# Patient Record
Sex: MALE | Race: WHITE | NOT HISPANIC OR LATINO | URBAN - METROPOLITAN AREA
[De-identification: names, ages, dates, MRNs, and addresses within clinical notes are randomized per-mention and may not be internally consistent; named-entity substitution may affect disease eponyms.]

---

## 2020-07-25 ENCOUNTER — TELEPHONE ENCOUNTER (OUTPATIENT)
Dept: URBAN - METROPOLITAN AREA CLINIC 13 | Facility: CLINIC | Age: 70
End: 2020-07-25

## 2020-07-25 RX ORDER — AMLODIPINE BESYLATE 5 MG/1
TABLET ORAL
Refills: 0 | OUTPATIENT
End: 2017-01-11

## 2020-07-25 RX ORDER — LEVOTHYROXINE SODIUM 0.05 MG/1
TABLET ORAL
Refills: 0 | OUTPATIENT
End: 2017-01-11

## 2020-07-25 RX ORDER — BENAZEPRIL HCL 20 MG
TABLET ORAL
Refills: 0 | OUTPATIENT
End: 2017-01-11

## 2020-07-26 ENCOUNTER — TELEPHONE ENCOUNTER (OUTPATIENT)
Dept: URBAN - METROPOLITAN AREA CLINIC 13 | Facility: CLINIC | Age: 70
End: 2020-07-26

## 2020-07-26 RX ORDER — HYDROCHLOROTHIAZIDE 25 MG/1
TAKE 0.5 TABLET EVERY OTHER DAY TABLET ORAL
Refills: 0 | Status: ACTIVE | COMMUNITY

## 2020-07-26 RX ORDER — AMLODIPINE BESYLATE 10 MG
TAKE 1 TABLET DAILY AS DIRECTED TABLET ORAL
Refills: 0 | Status: ACTIVE | COMMUNITY
Start: 2017-01-11

## 2020-07-26 RX ORDER — LEVOTHYROXINE SODIUM 50 UG/1
TAKE 1 TABLET DAILY AS DIRECTED TABLET ORAL
Refills: 0 | Status: ACTIVE | COMMUNITY
Start: 2017-01-11

## 2020-07-26 RX ORDER — ALPRAZOLAM 0.25 MG/1
TAKE 1 TABLET DAILY PRN TABLET ORAL
Refills: 0 | Status: ACTIVE | COMMUNITY

## 2020-07-26 RX ORDER — LOSARTAN POTASSIUM 100 MG/1
TAKE 1 TABLET DAILY TABLET, FILM COATED ORAL
Refills: 0 | Status: ACTIVE | COMMUNITY
Start: 2017-01-11

## 2022-04-01 ENCOUNTER — LAB OUTSIDE AN ENCOUNTER (OUTPATIENT)
Dept: URBAN - METROPOLITAN AREA CLINIC 113 | Facility: CLINIC | Age: 72
End: 2022-04-01

## 2022-04-01 ENCOUNTER — OFFICE VISIT (OUTPATIENT)
Dept: URBAN - METROPOLITAN AREA CLINIC 113 | Facility: CLINIC | Age: 72
End: 2022-04-01
Payer: MEDICARE

## 2022-04-01 ENCOUNTER — DASHBOARD ENCOUNTERS (OUTPATIENT)
Age: 72
End: 2022-04-01

## 2022-04-01 VITALS
RESPIRATION RATE: 20 BRPM | HEART RATE: 72 BPM | DIASTOLIC BLOOD PRESSURE: 70 MMHG | WEIGHT: 161 LBS | TEMPERATURE: 97.8 F | SYSTOLIC BLOOD PRESSURE: 123 MMHG | HEIGHT: 70 IN | BODY MASS INDEX: 23.05 KG/M2

## 2022-04-01 DIAGNOSIS — K76.0 FATTY LIVER: ICD-10-CM

## 2022-04-01 DIAGNOSIS — K82.4 GALLBLADDER POLYP: ICD-10-CM

## 2022-04-01 DIAGNOSIS — K59.09 OTHER CONSTIPATION: ICD-10-CM

## 2022-04-01 DIAGNOSIS — Z85.038 HISTORY OF COLON CANCER: ICD-10-CM

## 2022-04-01 PROCEDURE — 99204 OFFICE O/P NEW MOD 45 MIN: CPT | Performed by: NURSE PRACTITIONER

## 2022-04-01 RX ORDER — ALBUTEROL SULFATE 2.5 MG/3ML
3 ML AS NEEDED SOLUTION RESPIRATORY (INHALATION)
Status: ACTIVE | COMMUNITY

## 2022-04-01 RX ORDER — KRILL/OM-3/DHA/EPA/PHOSPHO/AST 1000-230MG
1 TABLET CAPSULE ORAL ONCE A DAY
Status: ACTIVE | COMMUNITY

## 2022-04-01 RX ORDER — ALPRAZOLAM 0.25 MG/1
TAKE 1 TABLET DAILY PRN TABLET ORAL
Refills: 0 | Status: DISCONTINUED | COMMUNITY

## 2022-04-01 RX ORDER — IBUPROFEN 200 MG
1 TABLET WITH MEALS CAPSULE ORAL TWICE A DAY
Status: ACTIVE | COMMUNITY

## 2022-04-01 RX ORDER — LEVOTHYROXINE SODIUM 50 UG/1
TAKE 1 TABLET DAILY AS DIRECTED TABLET ORAL
Refills: 0 | Status: DISCONTINUED | COMMUNITY
Start: 2017-01-11

## 2022-04-01 RX ORDER — AMLODIPINE BESYLATE 10 MG
TAKE 1 TABLET DAILY AS DIRECTED TABLET ORAL
Refills: 0 | Status: DISCONTINUED | COMMUNITY
Start: 2017-01-11

## 2022-04-01 RX ORDER — LEVOTHYROXINE SODIUM 50 UG/1
1 TABLET IN THE MORNING ON AN EMPTY STOMACH TABLET ORAL ONCE A DAY
Status: ACTIVE | COMMUNITY

## 2022-04-01 RX ORDER — LOSARTAN POTASSIUM 100 MG/1
TAKE 1 TABLET DAILY TABLET, FILM COATED ORAL
Refills: 0 | Status: ACTIVE | COMMUNITY
Start: 2017-01-11

## 2022-04-01 RX ORDER — HYDROCHLOROTHIAZIDE 25 MG/1
TAKE 0.5 TABLET EVERY OTHER DAY TABLET ORAL
Refills: 0 | Status: ACTIVE | COMMUNITY

## 2022-04-01 NOTE — HPI-TODAY'S VISIT:
This is a 72-year-old male who was diagnosed with colon cancer in 1987 status post resection of the rectum and sigmoid colon with end colostomy and adenomatous colon polyps presenting to schedule a surveillance colonoscopy. He reports stable colostomy output.  He has to irrigate his colostomy every morning reporting output is chronically "slow" occasionally, he has to increase the amount of water depending on his diet.  He denies any other abdominal symptoms. He reports recent labs demonstrated elevated liver enzymes.  He had a subsequent ultrasound.  He has decreased alcohol use and follow-up labs are planned with Dr. Cortez.  Available records are below.

## 2022-04-01 NOTE — HPI-OTHER HISTORIES
Abdominal ultrasound 3/15/2022:Hepatic steatosis, cholelithiasis, 4 mm gallbladder polyp, left renal 9.0 cm simple cyst. Labs 2/21/2022:Hepatitis A antibody IgM, hepatitis B surface antigen, hepatitis B core antibody, hepatitis C antibody nonreactive.

## 2022-04-02 PROBLEM — 197321007: Status: ACTIVE | Noted: 2022-04-02

## 2022-04-02 PROBLEM — 70342003: Status: ACTIVE | Noted: 2022-04-02

## 2022-04-02 PROBLEM — 14760008: Status: ACTIVE | Noted: 2022-04-02

## 2022-04-02 PROBLEM — 197433003: Status: ACTIVE | Noted: 2022-04-02

## 2022-04-11 PROBLEM — 429699009: Status: ACTIVE | Noted: 2022-04-01

## 2022-04-19 ENCOUNTER — WEB ENCOUNTER (OUTPATIENT)
Dept: URBAN - METROPOLITAN AREA SURGERY CENTER 25 | Facility: SURGERY CENTER | Age: 72
End: 2022-04-19

## 2022-04-25 ENCOUNTER — OFFICE VISIT (OUTPATIENT)
Dept: URBAN - METROPOLITAN AREA SURGERY CENTER 25 | Facility: SURGERY CENTER | Age: 72
End: 2022-04-25
Payer: MEDICARE

## 2022-04-25 DIAGNOSIS — Z85.038 HISTORY OF COLON CANCER: ICD-10-CM

## 2022-04-25 DIAGNOSIS — Z86.010 ADENOMAS PERSONAL HISTORY OF COLONIC POLYPS: ICD-10-CM

## 2022-04-25 PROCEDURE — 44388 COLONOSCOPY THRU STOMA SPX: CPT | Performed by: INTERNAL MEDICINE

## 2022-04-25 PROCEDURE — G8907 PT DOC NO EVENTS ON DISCHARG: HCPCS | Performed by: INTERNAL MEDICINE

## 2022-04-25 RX ORDER — ALBUTEROL SULFATE 2.5 MG/3ML
3 ML AS NEEDED SOLUTION RESPIRATORY (INHALATION)
Status: ACTIVE | COMMUNITY

## 2022-04-25 RX ORDER — IBUPROFEN 200 MG
1 TABLET WITH MEALS CAPSULE ORAL TWICE A DAY
Status: ACTIVE | COMMUNITY

## 2022-04-25 RX ORDER — LOSARTAN POTASSIUM 100 MG/1
TAKE 1 TABLET DAILY TABLET, FILM COATED ORAL
Refills: 0 | Status: ACTIVE | COMMUNITY
Start: 2017-01-11

## 2022-04-25 RX ORDER — KRILL/OM-3/DHA/EPA/PHOSPHO/AST 1000-230MG
1 TABLET CAPSULE ORAL ONCE A DAY
Status: ACTIVE | COMMUNITY

## 2022-04-25 RX ORDER — HYDROCHLOROTHIAZIDE 25 MG/1
TAKE 0.5 TABLET EVERY OTHER DAY TABLET ORAL
Refills: 0 | Status: ACTIVE | COMMUNITY

## 2022-04-25 RX ORDER — LEVOTHYROXINE SODIUM 50 UG/1
1 TABLET IN THE MORNING ON AN EMPTY STOMACH TABLET ORAL ONCE A DAY
Status: ACTIVE | COMMUNITY

## 2022-05-26 ENCOUNTER — OFFICE VISIT (OUTPATIENT)
Dept: URBAN - METROPOLITAN AREA CLINIC 113 | Facility: CLINIC | Age: 72
End: 2022-05-26